# Patient Record
Sex: OTHER/UNKNOWN | Race: WHITE | Employment: UNEMPLOYED | URBAN - METROPOLITAN AREA
[De-identification: names, ages, dates, MRNs, and addresses within clinical notes are randomized per-mention and may not be internally consistent; named-entity substitution may affect disease eponyms.]

---

## 2022-07-01 ENCOUNTER — VIRTUAL VISIT (OUTPATIENT)
Dept: PRIMARY CARE CLINIC | Age: 32
End: 2022-07-01
Payer: COMMERCIAL

## 2022-07-01 DIAGNOSIS — H02.823 MILIA OF EYELIDS OF BOTH EYES: ICD-10-CM

## 2022-07-01 DIAGNOSIS — L70.0 ACNE VULGARIS: ICD-10-CM

## 2022-07-01 DIAGNOSIS — B00.1 RECURRENT COLD SORES: Primary | ICD-10-CM

## 2022-07-01 DIAGNOSIS — H02.826 MILIA OF EYELIDS OF BOTH EYES: ICD-10-CM

## 2022-07-01 DIAGNOSIS — J06.9 VIRAL URI WITH COUGH: ICD-10-CM

## 2022-07-01 PROCEDURE — 99203 OFFICE O/P NEW LOW 30 MIN: CPT | Performed by: FAMILY MEDICINE

## 2022-07-01 NOTE — PROGRESS NOTES
Isac Sequeira  28 y.o. adult  1990  [address] [place] [address]  [phone]   [facility]   Telemedicine Progress Note  Azam Hermosillo DO       Encounter Date and Time: July 6, 2022 at 11:50 AM    Consent: Isac Sequeira, who was seen by synchronous (real-time) audio-video technology, and/or [employer] healthcare decision maker, is aware that this patient-initiated, Telehealth encounter on 7/1/2022 is a billable service, with coverage as determined by [employer] carrier. Isac Sequeira is aware that Isac Sequeira may receive a bill and has provided verbal consent to proceed: Yes. Chief Complaint   Patient presents with   [address] [facility]     decressed immunity - face      History of Present Illness   Isac Sequeira is a 28 y.o. adult was evaluated by synchronous (real-time) audio-video technology from home, through a secure patient portal.    States she has cold sore outbreaks about 1-2x yearly. States they will blister with clear fluid and then scab over above her mouth. States she has small white bumps on her eyebrows and above upper eyelids that have been increasing in number and will not go away. Has looked up online and thinks she may have milia. States she was coughing about a week ago. States it was productive or white/ yellowish phlegm. Initially had pain with coughing that has since resolved. States cough has been improving. States she has been using cough drops. She tested negative for COVID 19. She states she has acne on her chin area. She uses a gentle cleanser and one containing benzoyl peroxide. She takes fish oil as well for overall health. Review of Systems   Review of Systems   Constitutional: Negative for fever. Respiratory: Positive for cough and sputum production.       Vitals/Objective:     General: alert, cooperative, no distress   Mental  status: mental status: alert, oriented to person, place, and time, normal mood, behavior, speech, dress, motor activity, and thought processes   Resp: resp: normal effort and no respiratory distress   Neuro: neuro: no gross deficits   Skin: skin: no discoloration or lesions of concern on visible areas   Due to this being a TeleHealth evaluation, many elements of the physical examination are unable to be assessed. Assessment and Plan:   Time-based coding, delete if not needed: I spent at least 10 minutes with this new patient, and >50% of the time was spent counseling and/or coordinating care regarding cough, acne, skin lesion, cold sores    1. Recurrent cold sores  Can use Valtrex PRN lesions if only have one outbreak yearly. States she will call back if she has an outbreak. 2. Acne vulgaris  Recommend cleanser containing salicylic acid in AM and gentle cleanser at night. 3. Milia of eyelids of both eyes  Discussed these can be removed by a Dermatologist. Would like to hold on referral for now. 4. Viral URI with cough  Can continue to use cough drops and dextromethorphan PRN. Time spent in direct conversation with the patient to include medical condition(s) discussed, assessment and treatment plan: 16 min    We discussed the expected course, resolution and complications of the diagnosis(es) in detail. Medication risks, benefits, costs, interactions, and alternatives were discussed as indicated. I advised Ana Magana to contact the office if Sunday Molt condition worsens, changes or fails to improve as anticipated. Ana Magana expressed understanding with the diagnosis(es) and plan. Patient understands that this encounter was a temporary measure, and the importance of further follow up and examination was emphasized. Patient verbalized understanding. Electronically Signed: Bebe President, DO Perez Tadeonatasha is a 28 y.o. adult who was evaluated by an audio-video encounter for concerns as above. Patient identification was verified prior to start of the visit.  A caregiver was present when appropriate. Due to this being a TeleHealth encounter (During XDYCX-12 public health emergency), evaluation of the following organ systems was limited: Vitals/Constitutional/EENT/Resp/CV/GI//MS/Neuro/Skin/Heme-Lymph-Imm. Pursuant to the emergency declaration under the [address] [place] [address] [facility] authority and the [employer] and [employer] Act, this Virtual Visit was conducted, with patient's (and/or legal guardian's) consent, to reduce the patient's risk of exposure to COVID-19 and provide necessary medical care. Services were provided through a synchronous discussion virtually to substitute for in-person clinic visit. I was in the office. The patient was at home. History   Patients past medical, surgical and family histories were reviewed and updated. History reviewed. No pertinent past medical history. History reviewed. No pertinent surgical history. Family History   Family history unknown: Yes     Social History     Socioeconomic History    Marital status:      Spouse name: Not on file    Number of children: Not on file    Years of education: Not on file    Highest education level: Not on file   Occupational History    Not on file   Tobacco Use    Smoking status: Never Smoker    Smokeless tobacco: Never Used   Vaping Use    Vaping Use: Never used   Substance and Sexual Activity    Alcohol use: Not Currently    Drug use: Not on file    Sexual activity: Yes   Other Topics Concern    Not on file   Social History Narrative    Not on file     Social Determinants of Health     Financial Resource Strain:     Difficulty of Paying Living Expenses: Not on file   Food Insecurity:     Worried About Running Out of Food in the Last Year: Not on file    Leanna of Food in the Last Year: Not on file   Transportation Needs:     Lack of Transportation (Medical): Not on file    Lack of Transportation (Non-Medical):  Not on file   Physical Activity:     Days of Exercise per Week: Not on file    Minutes of Exercise per Session: Not on file   Stress:     Feeling of Stress : Not on file   Social Connections:     Frequency of Communication with Friends and Family: Not on file    Frequency of Social Gatherings with Friends and Family: Not on file    Attends [employer] Services: Not on file    Active Member of [address] [place] [address] or Organizations: Not on file    Attends Club or Organization Meetings: Not on file    Marital Status: Not on file   Intimate Partner Violence:     Fear of Current or Ex-Partner: Not on file    Emotionally Abused: Not on file    Physically Abused: Not on file    Sexually Abused: Not on file   Housing Stability:     Unable to Pay for Housing in the Last Year: Not on file    Number of [place] in the Last Year: Not on file    Unstable Housing in the Last Year: Not on file     There is no problem list on file for this patient.          Current Medications/Allergies   Medications and Allergies reviewed:      No Known Allergies

## 2022-07-01 NOTE — PROGRESS NOTES
Identified pt with two pt identifiers(name and ). Chief Complaint   Patient presents with   [address] Other     decressed immunity - face         3 most recent PHQ Screens 2022   Little interest or pleasure in doing things Not at all   Feeling down, depressed, irritable, or hopeless Not at all   Total Score PHQ 2 0   Trouble falling or staying asleep, or sleeping too much Not at all   Feeling tired or having little energy Not at all   Poor appetite, weight loss, or overeating Not at all   Feeling bad about yourself - or that you are a failure or have let yourself or your family down Not at all   Trouble concentrating on things such as school, work, reading, or watching TV Not at all   Moving or speaking so slowly that other people could have noticed; or the opposite being so fidgety that others notice Not at all   Thoughts of being better off dead, or hurting yourself in some way Not at all   PHQ 9 Score 0        There were no vitals filed for this visit. Health Maintenance Due   Topic Date Due    Depression Screen  Never done    DTaP/Tdap/Td series (1 - Tdap) Never done    COVID-19 Vaccine (2 - Moderna 3-dose series) 2022        1. Have you been to the ER, urgent care clinic since your last visit? Hospitalized since your last visit? No    2. Have you seen or consulted any other health care providers outside of the [address] [place] [address] since your last visit? Include any pap smears or colon screening. No    3. For patients aged 39-70: Has the patient had a colonoscopy / FIT/ Cologuard? No      If the patient is female:    4. For patients aged 41-77: Has the patient had a mammogram within the past 2 years? NA - based on age or sex      11. For patients aged 21-65: Has the patient had a pap smear?  NA - based on age or sex